# Patient Record
Sex: FEMALE | Race: WHITE
[De-identification: names, ages, dates, MRNs, and addresses within clinical notes are randomized per-mention and may not be internally consistent; named-entity substitution may affect disease eponyms.]

---

## 2017-02-09 ENCOUNTER — HOSPITAL ENCOUNTER (INPATIENT)
Dept: HOSPITAL 17 - NEPE | Age: 67
LOS: 2 days | Discharge: HOME | DRG: 66 | End: 2017-02-11
Attending: HOSPITALIST | Admitting: HOSPITALIST
Payer: MEDICARE

## 2017-02-09 VITALS
RESPIRATION RATE: 20 BRPM | OXYGEN SATURATION: 96 % | SYSTOLIC BLOOD PRESSURE: 157 MMHG | HEART RATE: 75 BPM | DIASTOLIC BLOOD PRESSURE: 82 MMHG

## 2017-02-09 VITALS
SYSTOLIC BLOOD PRESSURE: 149 MMHG | OXYGEN SATURATION: 97 % | RESPIRATION RATE: 20 BRPM | DIASTOLIC BLOOD PRESSURE: 76 MMHG | HEART RATE: 77 BPM

## 2017-02-09 VITALS — BODY MASS INDEX: 24.14 KG/M2 | WEIGHT: 127.87 LBS | HEIGHT: 61 IN

## 2017-02-09 VITALS — OXYGEN SATURATION: 97 %

## 2017-02-09 VITALS
RESPIRATION RATE: 20 BRPM | DIASTOLIC BLOOD PRESSURE: 87 MMHG | SYSTOLIC BLOOD PRESSURE: 194 MMHG | OXYGEN SATURATION: 97 % | HEART RATE: 89 BPM | TEMPERATURE: 98 F

## 2017-02-09 VITALS
RESPIRATION RATE: 20 BRPM | OXYGEN SATURATION: 97 % | HEART RATE: 83 BPM | DIASTOLIC BLOOD PRESSURE: 87 MMHG | SYSTOLIC BLOOD PRESSURE: 194 MMHG

## 2017-02-09 VITALS
DIASTOLIC BLOOD PRESSURE: 73 MMHG | HEART RATE: 76 BPM | SYSTOLIC BLOOD PRESSURE: 143 MMHG | RESPIRATION RATE: 20 BRPM | OXYGEN SATURATION: 99 %

## 2017-02-09 VITALS
SYSTOLIC BLOOD PRESSURE: 162 MMHG | DIASTOLIC BLOOD PRESSURE: 91 MMHG | OXYGEN SATURATION: 99 % | HEART RATE: 86 BPM | TEMPERATURE: 98 F | RESPIRATION RATE: 16 BRPM

## 2017-02-09 VITALS
SYSTOLIC BLOOD PRESSURE: 157 MMHG | OXYGEN SATURATION: 98 % | DIASTOLIC BLOOD PRESSURE: 75 MMHG | RESPIRATION RATE: 18 BRPM | HEART RATE: 80 BPM

## 2017-02-09 VITALS — HEART RATE: 72 BPM

## 2017-02-09 DIAGNOSIS — I63.8: Primary | ICD-10-CM

## 2017-02-09 DIAGNOSIS — Z91.041: ICD-10-CM

## 2017-02-09 DIAGNOSIS — M19.90: ICD-10-CM

## 2017-02-09 DIAGNOSIS — K21.9: ICD-10-CM

## 2017-02-09 DIAGNOSIS — Z91.013: ICD-10-CM

## 2017-02-09 DIAGNOSIS — Z88.1: ICD-10-CM

## 2017-02-09 DIAGNOSIS — E78.5: ICD-10-CM

## 2017-02-09 DIAGNOSIS — Z88.2: ICD-10-CM

## 2017-02-09 LAB
ALP SERPL-CCNC: 75 U/L (ref 45–117)
ALT SERPL-CCNC: 36 U/L (ref 10–53)
ANION GAP SERPL CALC-SCNC: 8 MEQ/L (ref 5–15)
AST SERPL-CCNC: 27 U/L (ref 15–37)
BACTERIA #/AREA URNS HPF: (no result) /HPF
BASOPHILS # BLD AUTO: 0.1 TH/MM3 (ref 0–0.2)
BASOPHILS NFR BLD: 1.1 % (ref 0–2)
BILIRUB SERPL-MCNC: 0.3 MG/DL (ref 0.2–1)
BUN SERPL-MCNC: 18 MG/DL (ref 7–18)
CHLORIDE SERPL-SCNC: 106 MEQ/L (ref 98–107)
CK MB SERPL-MCNC: 15.4 NG/ML (ref 0.5–3.6)
CK SERPL-CCNC: 766 U/L (ref 26–192)
COLOR UR: YELLOW
COMMENT (UR): (no result)
CULTURE IF INDICATED: (no result)
EOSINOPHIL # BLD: 0.4 TH/MM3 (ref 0–0.4)
EOSINOPHIL NFR BLD: 6 % (ref 0–4)
ERYTHROCYTE [DISTWIDTH] IN BLOOD BY AUTOMATED COUNT: 12.3 % (ref 11.6–17.2)
GFR SERPLBLD BASED ON 1.73 SQ M-ARVRAT: 63 ML/MIN (ref 89–?)
GLUCOSE UR STRIP-MCNC: (no result) MG/DL
HCO3 BLD-SCNC: 27.5 MEQ/L (ref 21–32)
HCT VFR BLD CALC: 42.3 % (ref 35–46)
HEMO FLAGS: (no result)
HEMOGLOBIN A1A: 0.8 %
HEMOGLOBIN A1B: 1.7 %
HEMOGLOBIN AO: 85.9 %
HEMOGLOBIN LA1C: 1.9 %
HEMOGLOBIN P3: 3.6 %
HGB UR QL STRIP: (no result)
KETONES UR STRIP-MCNC: (no result) MG/DL
LYMPHOCYTES # BLD AUTO: 2.1 TH/MM3 (ref 1–4.8)
LYMPHOCYTES NFR BLD AUTO: 27.8 % (ref 9–44)
MCH RBC QN AUTO: 30.4 PG (ref 27–34)
MCHC RBC AUTO-ENTMCNC: 33.8 % (ref 32–36)
MCV RBC AUTO: 90.1 FL (ref 80–100)
MONOCYTES NFR BLD: 8.4 % (ref 0–8)
NEUTROPHILS # BLD AUTO: 4.2 TH/MM3 (ref 1.8–7.7)
NEUTROPHILS NFR BLD AUTO: 56.7 % (ref 16–70)
NITRITE UR QL STRIP: (no result)
PLATELET # BLD: 246 TH/MM3 (ref 150–450)
POTASSIUM SERPL-SCNC: 3.8 MEQ/L (ref 3.5–5.1)
RBC # BLD AUTO: 4.7 MIL/MM3 (ref 4–5.3)
SODIUM SERPL-SCNC: 141 MEQ/L (ref 136–145)
SP GR UR STRIP: 1.01 (ref 1–1.03)
SQUAMOUS #/AREA URNS HPF: 4 /HPF (ref 0–5)
WBC # BLD AUTO: 7.4 TH/MM3 (ref 4–11)

## 2017-02-09 PROCEDURE — 93226 XTRNL ECG REC<48 HR SCAN A/R: CPT

## 2017-02-09 PROCEDURE — 87086 URINE CULTURE/COLONY COUNT: CPT

## 2017-02-09 PROCEDURE — 70450 CT HEAD/BRAIN W/O DYE: CPT

## 2017-02-09 PROCEDURE — 85025 COMPLETE CBC W/AUTO DIFF WBC: CPT

## 2017-02-09 PROCEDURE — 82552 ASSAY OF CPK IN BLOOD: CPT

## 2017-02-09 PROCEDURE — 83735 ASSAY OF MAGNESIUM: CPT

## 2017-02-09 PROCEDURE — 80053 COMPREHEN METABOLIC PANEL: CPT

## 2017-02-09 PROCEDURE — 82550 ASSAY OF CK (CPK): CPT

## 2017-02-09 PROCEDURE — 80048 BASIC METABOLIC PNL TOTAL CA: CPT

## 2017-02-09 PROCEDURE — 93005 ELECTROCARDIOGRAM TRACING: CPT

## 2017-02-09 PROCEDURE — 83036 HEMOGLOBIN GLYCOSYLATED A1C: CPT

## 2017-02-09 PROCEDURE — 84484 ASSAY OF TROPONIN QUANT: CPT

## 2017-02-09 PROCEDURE — 93880 EXTRACRANIAL BILAT STUDY: CPT

## 2017-02-09 PROCEDURE — 93225 XTRNL ECG REC<48 HRS REC: CPT

## 2017-02-09 PROCEDURE — 70551 MRI BRAIN STEM W/O DYE: CPT

## 2017-02-09 PROCEDURE — 81001 URINALYSIS AUTO W/SCOPE: CPT

## 2017-02-09 PROCEDURE — 82948 REAGENT STRIP/BLOOD GLUCOSE: CPT

## 2017-02-09 PROCEDURE — 93306 TTE W/DOPPLER COMPLETE: CPT

## 2017-02-09 PROCEDURE — 80061 LIPID PANEL: CPT

## 2017-02-09 RX ADMIN — ATORVASTATIN CALCIUM SCH MG: 20 TABLET, FILM COATED ORAL at 21:35

## 2017-02-09 RX ADMIN — SODIUM CHLORIDE, PRESERVATIVE FREE SCH ML: 5 INJECTION INTRAVENOUS at 21:00

## 2017-02-09 RX ADMIN — INSULIN ASPART SCH: 100 INJECTION, SOLUTION INTRAVENOUS; SUBCUTANEOUS at 21:00
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
## 2017-02-09 NOTE — RADRPT
EXAM DATE/TIME:  02/09/2017 07:50 

 

[facility] COMPARISON:     

No previous studies available for comparison.

 

 

INDICATIONS :     

Woke up with left arm numbness and tingling

                      

 

RADIATION DOSE:     

39.44 CTDIvol (mGy) 

 

 

 

MEDICAL HISTORY :     

None  

 

SURGICAL HISTORY :      

None. 

 

ENCOUNTER:      

Initial

 

ACUITY:      

1 day

 

PAIN SCALE:      

0/10

 

LOCATION:        

cranial 

 

TECHNIQUE:     

Multiple contiguous axial images were obtained of the head.  Using automated exposure control and adj
ustment of the mA and/or kV according to patient size, radiation dose was kept as low as reasonably a
chievable to obtain optimal diagnostic quality images. 

 

        FINDINGS:

 

CEREBRUM:     

The ventricles are normal for age.  No evidence of midline shift, mass lesion, hemorrhage or acute in
farction.  No extra-axial fluid collections are seen. Tiny old lacunar infarcts are noted within the 
right basal ganglia.

 

POSTERIOR FOSSA:     

The cerebellum and brainstem are intact.  The 4th ventricle is midline.  The cerebellopontine angle i
s unremarkable.

 

EXTRACRANIAL:     

The visualized portion of the orbits is intact. Mild mucosal thickening is noted involving the bilate
ral ethmoid and sphenoid sinuses.

 

SKULL:     

The calvaria is intact.  No evidence of skull fracture.

 

CONCLUSION:     

1. No acute hemorrhage, acute infarct, mass effect or extra axial fluid collections. 

2. Tiny old lacunar infarcts within the right basal ganglia. 

3. Mild mucosal thickening involving the bilateral ethmoid and sphenoid sinuses. 

 

 

 Asa Longoria MD on February 09, 2017 at 8:12           

Board Certified Radiologist.

 This report was verified electronically.
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
## 2017-02-09 NOTE — EKG
Date Performed: 02/09/2017       Time Performed: 08:32:56

 

PTAGE:      66 years

 

EKG:      Sinus rhythm 

 

 NORMAL ECG 

 

NO PREVIOUS TRACING            

 

DOCTOR:   Ashu Fry  Interpretating Date/Time  02/09/2017 13:39:51

## 2017-02-09 NOTE — EC
Study

 

Study Date:02/09/2017

 

 

 

STUDY CONCLUSIONS

 

SUMMARY

 

- Left ventricle: The cavity size was normal. Wall thickness was

increased in a pattern of mild LVH. Systolic function was normal.

The estimated ejection fraction was in the range of 55% to 60%.

Wall motion was normal; there were no regional wall motion

abnormalities. Doppler parameters are consistent with abnormal

left ventricular relaxation (grade 1 diastolic dysfunction).

- Aortic valve: Mild regurgitation.

- Left atrium: The atrium was mildly dilated.

 

-------------------------------------------------------------------

If LV function is below 40, please consider prescribing an ACEI or

ARB or document rationale for non-use.

 

-------------------------------------------------------------------

PROCEDURE DATA

 

STUDY STATUS:

Elective. Procedure: Transthoracic echocardiography.

Image quality was good. Scanning was performed from the

parasternal, apical, and subcostal acoustic windows. Study

completion: The patient tolerated the procedure well.

Transthoracic echocardiography. M-mode, complete 2D, complete

spectral Doppler, and color Doppler. Patient status: Inpatient.

 

-------------------------------------------------------------------

CARDIAC ANATOMY

 

LEFT VENTRICLE:

The cavity size was normal. Wall thickness was

increased in a pattern of mild LVH. Systolic function was normal.

The estimated ejection fraction was in the range of 55% to 60%.

Wall motion was normal; there were no regional wall motion

abnormalities. Doppler parameters are consistent with abnormal left

ventricular relaxation (grade 1 diastolic dysfunction).

 

AORTIC VALVE:

Trileaflet; normal thickness leaflets. Doppler:

Transvalvular velocity was within the normal range. There was no

stenosis. Mild regurgitation.

 

AORTA:

Aortic root: The aortic root was normal in size.

 

MITRAL VALVE:

Structurally normal valve. Doppler: Transvalvular

velocity was within the normal range. There was no evidence for

stenosis. Trace regurgitation.

 

LEFT ATRIUM:

The atrium was mildly dilated.

 

RIGHT VENTRICLE:

The cavity size was normal. Wall thickness was

normal.

 

PULMONIC VALVE:

Doppler: Transvalvular velocity was within the

normal range. There was no evidence for stenosis. No regurgitation.

 

TRICUSPID VALVE:

Structurally normal valve. Doppler: Transvalvular

velocity was within the normal range. No regurgitation.

 

PULMONARY ARTERY:

The main pulmonary artery was normal-sized.

Systolic pressure was within the normal range.

 

RIGHT ATRIUM:

The atrium was normal in size.

 

PERICARDIUM:

There was no pericardial effusion.

 

SYSTEMIC VEINS:

Inferior vena cava: The vessel was normal in size.

 

-------------------------------------------------------------------

 

BASIC MEASUREMENTS                                      ADULT

Normal

Left ventricle

LV internal dimension, ED, chordal level,     *42 mm    43-52

PLAX

LV internal dimension, ES, chordal level,    31.3 mm    23-38

PLAX

Fractional shortening, chordal level, PLAX    *25 %     >29

LV posterior wall thickness, ED              6.33 mm    -----------

IVS/LVPW ratio, ED                          *1.35       <1.3

Ventricular septum

Septal thickness, ED                         8.53 mm    -----------

Aortic valve

Leaflet separation                             19 mm    15-26

Left atrium

Anterior-posterior dimension                   32 mm    -----------

Right ventricle

RV internal dimension, ED, PLAX             *17.6 mm    19-38

 

BASIC MEASUREMENTS                                      ADULT

Normal

Aortic valve

Leaflet separation                             19 mm    15-26

Aorta

Root diameter, ED                              32 mm    20-37

 

DOPPLER MEASUREMENTS                                    ADULT

Normal

Main pulmonary artery

Pressure, S                                    24 mm Hg =30

Mitral valve

Peak E-wave velocity                         60.2 cm/s  -----------

Peak A-wave velocity                         84.9 cm/s  -----------

Peak E/A ratio                                0.7       -----------

Tricuspid valve

Regurgitant peak velocity                     171 cm/s  -----------

Peak RV-RA gradient, S                         12 mm Hg -----------

Maximal regurgitant velocity                  171 cm/s  -----------

Systemic veins

Estimated CVP                                   5 mm Hg -----------

Right ventricle

RV pressure, S                                 24 mm Hg <30

 

LEGEND:

Mean values are shown as u=mean value.

Asterisk (*) marks values outside specified normal range.

Prepared and signed by

 

Mario Zhu

[id number]
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
## 2017-02-10 VITALS
TEMPERATURE: 97.9 F | SYSTOLIC BLOOD PRESSURE: 129 MMHG | DIASTOLIC BLOOD PRESSURE: 68 MMHG | OXYGEN SATURATION: 95 % | RESPIRATION RATE: 19 BRPM | HEART RATE: 56 BPM

## 2017-02-10 VITALS
SYSTOLIC BLOOD PRESSURE: 115 MMHG | OXYGEN SATURATION: 96 % | DIASTOLIC BLOOD PRESSURE: 77 MMHG | RESPIRATION RATE: 19 BRPM | HEART RATE: 59 BPM

## 2017-02-10 VITALS
RESPIRATION RATE: 18 BRPM | SYSTOLIC BLOOD PRESSURE: 121 MMHG | DIASTOLIC BLOOD PRESSURE: 64 MMHG | TEMPERATURE: 97.9 F | OXYGEN SATURATION: 96 % | HEART RATE: 59 BPM

## 2017-02-10 VITALS
HEART RATE: 76 BPM | RESPIRATION RATE: 20 BRPM | DIASTOLIC BLOOD PRESSURE: 67 MMHG | SYSTOLIC BLOOD PRESSURE: 120 MMHG | OXYGEN SATURATION: 97 % | TEMPERATURE: 97.7 F

## 2017-02-10 VITALS
TEMPERATURE: 98.2 F | SYSTOLIC BLOOD PRESSURE: 121 MMHG | OXYGEN SATURATION: 94 % | DIASTOLIC BLOOD PRESSURE: 68 MMHG | HEART RATE: 64 BPM | RESPIRATION RATE: 18 BRPM

## 2017-02-10 VITALS
RESPIRATION RATE: 20 BRPM | OXYGEN SATURATION: 93 % | SYSTOLIC BLOOD PRESSURE: 110 MMHG | TEMPERATURE: 97.9 F | DIASTOLIC BLOOD PRESSURE: 67 MMHG | HEART RATE: 58 BPM

## 2017-02-10 VITALS — HEART RATE: 80 BPM

## 2017-02-10 VITALS — HEART RATE: 59 BPM

## 2017-02-10 VITALS
OXYGEN SATURATION: 96 % | TEMPERATURE: 97.4 F | RESPIRATION RATE: 20 BRPM | SYSTOLIC BLOOD PRESSURE: 110 MMHG | HEART RATE: 64 BPM | DIASTOLIC BLOOD PRESSURE: 67 MMHG

## 2017-02-10 VITALS — OXYGEN SATURATION: 96 %

## 2017-02-10 LAB
ANION GAP SERPL CALC-SCNC: 8 MEQ/L (ref 5–15)
BASOPHILS # BLD AUTO: 0.1 TH/MM3 (ref 0–0.2)
BASOPHILS NFR BLD: 0.8 % (ref 0–2)
BUN SERPL-MCNC: 18 MG/DL (ref 7–18)
CHLORIDE SERPL-SCNC: 106 MEQ/L (ref 98–107)
EOSINOPHIL # BLD: 0.2 TH/MM3 (ref 0–0.4)
EOSINOPHIL NFR BLD: 1.5 % (ref 0–4)
ERYTHROCYTE [DISTWIDTH] IN BLOOD BY AUTOMATED COUNT: 12.3 % (ref 11.6–17.2)
GFR SERPLBLD BASED ON 1.73 SQ M-ARVRAT: 86 ML/MIN (ref 89–?)
HCO3 BLD-SCNC: 25.8 MEQ/L (ref 21–32)
HCT VFR BLD CALC: 39.2 % (ref 35–46)
HDLC SERPL-MCNC: 51.5 MG/DL (ref 40–60)
HEMO FLAGS: (no result)
LDLC SERPL-MCNC: 132 MG/DL (ref 0–99)
LYMPHOCYTES # BLD AUTO: 2 TH/MM3 (ref 1–4.8)
LYMPHOCYTES NFR BLD AUTO: 18.5 % (ref 9–44)
MAGNESIUM SERPL-MCNC: 2 MG/DL (ref 1.5–2.5)
MCH RBC QN AUTO: 30.8 PG (ref 27–34)
MCHC RBC AUTO-ENTMCNC: 34.2 % (ref 32–36)
MCV RBC AUTO: 90.1 FL (ref 80–100)
MONOCYTES NFR BLD: 7.7 % (ref 0–8)
NEUTROPHILS # BLD AUTO: 7.8 TH/MM3 (ref 1.8–7.7)
NEUTROPHILS NFR BLD AUTO: 71.5 % (ref 16–70)
PLATELET # BLD: 236 TH/MM3 (ref 150–450)
POTASSIUM SERPL-SCNC: 3.7 MEQ/L (ref 3.5–5.1)
RBC # BLD AUTO: 4.35 MIL/MM3 (ref 4–5.3)
SODIUM SERPL-SCNC: 140 MEQ/L (ref 136–145)
WBC # BLD AUTO: 10.9 TH/MM3 (ref 4–11)

## 2017-02-10 RX ADMIN — SODIUM CHLORIDE, PRESERVATIVE FREE SCH ML: 5 INJECTION INTRAVENOUS at 22:08

## 2017-02-10 RX ADMIN — SODIUM CHLORIDE, PRESERVATIVE FREE SCH ML: 5 INJECTION INTRAVENOUS at 09:00

## 2017-02-10 RX ADMIN — INSULIN ASPART SCH: 100 INJECTION, SOLUTION INTRAVENOUS; SUBCUTANEOUS at 21:00

## 2017-02-10 RX ADMIN — ATORVASTATIN CALCIUM SCH MG: 20 TABLET, FILM COATED ORAL at 22:08

## 2017-02-10 RX ADMIN — PANTOPRAZOLE SODIUM SCH MG: 20 TABLET, DELAYED RELEASE ORAL at 09:00

## 2017-02-10 RX ADMIN — ASPIRIN SCH MG: 325 TABLET, DELAYED RELEASE ORAL at 09:00

## 2017-02-10 RX ADMIN — INSULIN ASPART SCH: 100 INJECTION, SOLUTION INTRAVENOUS; SUBCUTANEOUS at 07:00

## 2017-02-10 RX ADMIN — INSULIN ASPART SCH: 100 INJECTION, SOLUTION INTRAVENOUS; SUBCUTANEOUS at 18:30

## 2017-02-10 RX ADMIN — INSULIN ASPART SCH: 100 INJECTION, SOLUTION INTRAVENOUS; SUBCUTANEOUS at 12:30

## 2017-02-10 NOTE — HHI.PR
Subjective


Remarks


just minor tingling left lip.


ambulating.





Objective


Vitals


heart reg


lung cta


abd s/nt


ext no edema


 Vital Signs








  Date Time  Temp Pulse Resp B/P Pulse Ox O2 Delivery O2 Flow Rate FiO2


 


2/10/17 12:00 97.9 58 20 110/67 93   


 


2/10/17 08:53     96   21


 


2/10/17 08:35  59 19 115/62 96   





    114/62    





    131/77    


 


2/10/17 07:33 97.9 59 18 121/64 96   


 


2/10/17 03:42 97.4 64 20 110/67 96   


 


2/10/17 00:10 97.9 56 19 129/68 95   


 


2/9/17 23:30  72      


 


2/9/17 21:00     97   21


 


2/9/17 19:03  80 18 157/75 98 Room Air  


 


2/9/17 14:22  75 20 157/82 96 Room Air  








Result Diagram:  


2/10/17 0639                                                                   

             2/10/17 0639





Imaging





Last Impressions








Brain MRI 2/9/17 0920 Signed





Impressions: 





 Service Date/Time:  Thursday, February 9, 2017 12:21 - CONCLUSION:  1. Tiny 





 faint focal signal abnormality within the right thalamus on the diffusion 





 weighted images suggesting acute/subacute lacunar infarct.  2. No acute 





 hemorrhage, midline shift or extra-axial fluid collection.  3. Mucosal 





 thickening involving the bilateral sphenoid sinuses and posterior ethmoid air 





 cells.     Asa Longoria MD 


 


Head CT 2/9/17 0724 Signed





Impressions: 





 Service Date/Time:  Thursday, February 9, 2017 07:50 - CONCLUSION:  1. No 

acute 





 hemorrhage, acute infarct, mass effect or extra axial fluid collections.  2. 





 Tiny old lacunar infarcts within the right basal ganglia.  3. Mild mucosal 





 thickening involving the bilateral ethmoid and sphenoid sinuses.     Asa Longoria MD 


 


Abdomen X-Ray 2/9/17 0000 Signed





Impressions: 





 Service Date/Time:  Thursday, February 9, 2017 10:30 - CONCLUSION:  No 

evidence 





 of obstruction.  No MRI incompatible foreign body is identified. 

Cholelithiasis 





 incidentally noted.     Austyn Lacey MD 











A/P


Problem List:  


(1) CVA (cerebral vascular accident)


Status:  Acute


Plan:  


- Pt presented to the ED with complaints of left facial tingling and left arm 

and leg tingling that began this morning upon awakening. 


- In the ED her BP was noted to be elevated, 162/91 and elevated to 194/87. 


- Head CT which showed no acute hemorrhage, acute infarct, mass effect or extra 

axial fluid collections, tiny old lacunar infarcts 


 within the right basal ganglia and mild mucosal thickening involving the 

bilateral ethmoid and sphenoid sinuses. 


- MRI Brain which indicated a tiny faint focal signal abnormality within the 

right thalamus on the diffusion weighted images suggesting 


 acute/subacute lacunar infarct but no acute hemorrhage, midline shift or extra-

axial fluid collection.


- Pt is being admitted for CVA. 


-


d/c hob flat. d/c ivf.


cont asa/statin


monitor tele/holter.


ambulate


if stable then d/c in AM.











Zeke Flores MD Feb 10, 2017 12:54

## 2017-02-11 VITALS
OXYGEN SATURATION: 95 % | DIASTOLIC BLOOD PRESSURE: 70 MMHG | HEART RATE: 61 BPM | SYSTOLIC BLOOD PRESSURE: 116 MMHG | RESPIRATION RATE: 18 BRPM

## 2017-02-11 VITALS
TEMPERATURE: 98.2 F | OXYGEN SATURATION: 97 % | HEART RATE: 68 BPM | RESPIRATION RATE: 19 BRPM | DIASTOLIC BLOOD PRESSURE: 56 MMHG | SYSTOLIC BLOOD PRESSURE: 118 MMHG

## 2017-02-11 VITALS
OXYGEN SATURATION: 98 % | SYSTOLIC BLOOD PRESSURE: 116 MMHG | RESPIRATION RATE: 18 BRPM | HEART RATE: 70 BPM | DIASTOLIC BLOOD PRESSURE: 70 MMHG | TEMPERATURE: 98 F

## 2017-02-11 VITALS — HEART RATE: 78 BPM

## 2017-02-11 RX ADMIN — INSULIN ASPART SCH: 100 INJECTION, SOLUTION INTRAVENOUS; SUBCUTANEOUS at 05:45

## 2017-02-11 RX ADMIN — ASPIRIN SCH MG: 325 TABLET, DELAYED RELEASE ORAL at 08:20

## 2017-02-11 RX ADMIN — PANTOPRAZOLE SODIUM SCH MG: 20 TABLET, DELAYED RELEASE ORAL at 08:19

## 2017-02-11 RX ADMIN — INSULIN ASPART SCH: 100 INJECTION, SOLUTION INTRAVENOUS; SUBCUTANEOUS at 11:00

## 2017-02-11 RX ADMIN — SODIUM CHLORIDE, PRESERVATIVE FREE SCH ML: 5 INJECTION INTRAVENOUS at 08:20

## 2017-02-11 NOTE — HHI.DCPOC
Discharge Care Plan


Diagnosis:  


(1) CVA (cerebral vascular accident)


Goals to Promote Your Health


* To prevent worsening of your condition and complications


* To maintain your health at the optimal level


Directions to Meet Your Goals


*** Take your medications as prescribed


*** Follow your dietary instruction


*** Follow activity as directed








*** Keep your appointments as scheduled


*** Take your immunizations and boosters as scheduled


*** If your symptoms worsen call your PCP, if no PCP go to Urgent Care Center 

or Emergency Room***


*** Smoking is Dangerous to Your Health. Avoid second hand smoke***


***Call the 24-hour hour crisis hotline for domestic abuse at 1-831.800.8934***








Zeke Flores MD Feb 11, 2017 12:06

## 2017-02-11 NOTE — HHI.PR
Subjective


Remarks


feels good . wants to go home.





Objective


Vitals


heart rg


lung cta


abd s/nte


ext no edema


 Vital Signs








  Date Time  Temp Pulse Resp B/P Pulse Ox O2 Delivery O2 Flow Rate FiO2


 


2/11/17 08:30  61 18 116/70 95   


 


2/11/17 03:54 98.2 68 19 118/56 97   


 


2/11/17 00:24 98.0 70 18 116/70 98   


 


2/10/17 20:11 98.2 64 18 121/68 94   


 


2/10/17 20:00  80      


 


2/10/17 15:40 97.7 76 20 120/67 97   








Result Diagram:  


2/10/17 0639                                                                   

             2/10/17 0639





Imaging





Last Impressions








Brain MRI 2/9/17 0920 Signed





Impressions: 





 Service Date/Time:  Thursday, February 9, 2017 12:21 - CONCLUSION:  1. Tiny 





 faint focal signal abnormality within the right thalamus on the diffusion 





 weighted images suggesting acute/subacute lacunar infarct.  2. No acute 





 hemorrhage, midline shift or extra-axial fluid collection.  3. Mucosal 





 thickening involving the bilateral sphenoid sinuses and posterior ethmoid air 





 cells.     Asa Longoria MD 


 


Head CT 2/9/17 0724 Signed





Impressions: 





 Service Date/Time:  Thursday, February 9, 2017 07:50 - CONCLUSION:  1. No 

acute 





 hemorrhage, acute infarct, mass effect or extra axial fluid collections.  2. 





 Tiny old lacunar infarcts within the right basal ganglia.  3. Mild mucosal 





 thickening involving the bilateral ethmoid and sphenoid sinuses.     Asa Longoria MD 


 


Abdomen X-Ray 2/9/17 0000 Signed





Impressions: 





 Service Date/Time:  Thursday, February 9, 2017 10:30 - CONCLUSION:  No 

evidence 





 of obstruction.  No MRI incompatible foreign body is identified. 

Cholelithiasis 





 incidentally noted.     Austyn Lacey MD 











A/P


Problem List:  


(1) CVA (cerebral vascular accident)


Status:  Acute


Plan:  


- Pt presented to the ED with complaints of left facial tingling and left arm 

and leg tingling that began this morning upon awakening. 


- In the ED her BP was noted to be elevated, 162/91 and elevated to 194/87. 


- Head CT which showed no acute hemorrhage, acute infarct, mass effect or extra 

axial fluid collections, tiny old lacunar infarcts 


 within the right basal ganglia and mild mucosal thickening involving the 

bilateral ethmoid and sphenoid sinuses. 


- MRI Brain which indicated a tiny faint focal signal abnormality within the 

right thalamus on the diffusion weighted images suggesting 


 acute/subacute lacunar infarct but no acute hemorrhage, midline shift or extra-

axial fluid collection.


- Pt is being admitted for CVA. 


-


tele nsr


f/u holter


cont asa/statin. this could be increased as outpt as tolerated.


d/c with neuro f/u.











Zeke Flores MD Feb 11, 2017 12:04

## 2017-02-12 NOTE — HM
Date Performed: 02/11/2017       Time Performed: 10:31:00

 

HOOKUP DATE:      02/11/17 10:31:00 AM Sat 

 

     

ANALYSIS START TIME:      2/11/2017 10:36:00 AM

     

ANALYSIS END TIME:      2/12/2017 10:40:00 AM

     

PATIENT AGE:      66

     

PATIENT HEIGHT:      61

     

PATIENT WEIGHT:      127

     

DRUG LIST     

     

PATIENT DIAGNOSIS:      STROKE

     

TEST NARRATIVE:          The patient's average heart rate was 71 BPM.  No episodes of tachycardia wer
e noted.  Heart rates less than 50 BPM were noted < 1% of the time.     No pauses exceeding 2.0 secon
ds were noted.     205 ventricular ectopics, which represented < 1% of the total beat count, were not
ed.  The highest ventricular ectopic frequency occurred from 07:00 AM to 08:00 AM Sun.  During this t
nevaeh 42 VE(s) occurred.  Ventricular ectopics were observed as 205 isolated beat(s) only.  No couplets
 or runs were noted.     6 supraventricular ectopics, which represented < 1% of the total beat count,
 were noted.  The highest supraventricular ectopic frequency occurred from 02:00 PM to 03:00 PM Sat. 
 During this time 1 SVE(s) occurred.     No episodes of ST depression (defined as -1.0 mm or more) we
re noted in channel 1.  No episodes of ST depression (defined as -1.0 mm or more) were noted in chann
el 2.  No episodes of ST depression (defined as -1.0 mm or more) were noted in channel 3.      No ludwin
ry events were reported by the patient.

     

TEST INTERPRETATION:      Holter monitor is for 24 hours and 4 minutes with a minimum HR of 49 and ma
ximum HR of 107 with an average of 71. There 2 identified PVCs and 6 PACs, but no atrial fibrillation
 Conclusions: Occasional ectopy of above, but no sustained atrial fibrillation or other dysrhythmia. 
So symptoms were reported with this recording                                                        
             Signed by : Marshall Tate

## 2017-02-13 ENCOUNTER — HOSPITAL ENCOUNTER (OUTPATIENT)
Dept: HOSPITAL 17 - NEPC | Age: 67
Setting detail: OBSERVATION
LOS: 1 days | Discharge: HOME | End: 2017-02-14
Attending: INTERNAL MEDICINE | Admitting: INTERNAL MEDICINE
Payer: MEDICARE

## 2017-02-13 VITALS — BODY MASS INDEX: 24.03 KG/M2 | HEIGHT: 61 IN | WEIGHT: 127.27 LBS

## 2017-02-13 VITALS
OXYGEN SATURATION: 96 % | DIASTOLIC BLOOD PRESSURE: 70 MMHG | RESPIRATION RATE: 16 BRPM | HEART RATE: 75 BPM | SYSTOLIC BLOOD PRESSURE: 153 MMHG

## 2017-02-13 VITALS
SYSTOLIC BLOOD PRESSURE: 129 MMHG | OXYGEN SATURATION: 95 % | HEART RATE: 77 BPM | DIASTOLIC BLOOD PRESSURE: 74 MMHG | RESPIRATION RATE: 18 BRPM

## 2017-02-13 VITALS
RESPIRATION RATE: 16 BRPM | OXYGEN SATURATION: 97 % | DIASTOLIC BLOOD PRESSURE: 81 MMHG | HEART RATE: 68 BPM | TEMPERATURE: 97.8 F | SYSTOLIC BLOOD PRESSURE: 177 MMHG

## 2017-02-13 VITALS
HEART RATE: 76 BPM | SYSTOLIC BLOOD PRESSURE: 199 MMHG | OXYGEN SATURATION: 98 % | DIASTOLIC BLOOD PRESSURE: 87 MMHG | RESPIRATION RATE: 16 BRPM

## 2017-02-13 VITALS — OXYGEN SATURATION: 95 %

## 2017-02-13 DIAGNOSIS — K21.9: ICD-10-CM

## 2017-02-13 DIAGNOSIS — Z82.49: ICD-10-CM

## 2017-02-13 DIAGNOSIS — Z86.73: ICD-10-CM

## 2017-02-13 DIAGNOSIS — E78.5: ICD-10-CM

## 2017-02-13 DIAGNOSIS — R06.02: Primary | ICD-10-CM

## 2017-02-13 LAB
ALP SERPL-CCNC: 74 U/L (ref 45–117)
ALT SERPL-CCNC: 37 U/L (ref 10–53)
ANION GAP SERPL CALC-SCNC: 6 MEQ/L (ref 5–15)
APTT BLD: 26.4 SEC (ref 24.3–30.1)
AST SERPL-CCNC: 26 U/L (ref 15–37)
BASOPHILS # BLD AUTO: 0.1 TH/MM3 (ref 0–0.2)
BASOPHILS NFR BLD: 0.7 % (ref 0–2)
BILIRUB SERPL-MCNC: 0.3 MG/DL (ref 0.2–1)
BUN SERPL-MCNC: 20 MG/DL (ref 7–18)
CHLORIDE SERPL-SCNC: 104 MEQ/L (ref 98–107)
CK MB SERPL-MCNC: 5.4 NG/ML (ref 0.5–3.6)
CK SERPL-CCNC: 249 U/L (ref 26–192)
EOSINOPHIL # BLD: 0.3 TH/MM3 (ref 0–0.4)
EOSINOPHIL NFR BLD: 2.9 % (ref 0–4)
ERYTHROCYTE [DISTWIDTH] IN BLOOD BY AUTOMATED COUNT: 12.1 % (ref 11.6–17.2)
GFR SERPLBLD BASED ON 1.73 SQ M-ARVRAT: 62 ML/MIN (ref 89–?)
HCO3 BLD-SCNC: 30.8 MEQ/L (ref 21–32)
HCT VFR BLD CALC: 40.9 % (ref 35–46)
HEMO FLAGS: (no result)
INR PPP: 1 RATIO
LYMPHOCYTES # BLD AUTO: 2.4 TH/MM3 (ref 1–4.8)
LYMPHOCYTES NFR BLD AUTO: 24.5 % (ref 9–44)
MAGNESIUM SERPL-MCNC: 2.1 MG/DL (ref 1.5–2.5)
MCH RBC QN AUTO: 30.2 PG (ref 27–34)
MCHC RBC AUTO-ENTMCNC: 33.6 % (ref 32–36)
MCV RBC AUTO: 89.9 FL (ref 80–100)
MONOCYTES NFR BLD: 8.4 % (ref 0–8)
NEUTROPHILS # BLD AUTO: 6.2 TH/MM3 (ref 1.8–7.7)
NEUTROPHILS NFR BLD AUTO: 63.5 % (ref 16–70)
PLATELET # BLD: 242 TH/MM3 (ref 150–450)
POTASSIUM SERPL-SCNC: 3.8 MEQ/L (ref 3.5–5.1)
PROTHROMBIN TIME: 10.5 SEC (ref 9.8–11.6)
RBC # BLD AUTO: 4.55 MIL/MM3 (ref 4–5.3)
SODIUM SERPL-SCNC: 141 MEQ/L (ref 136–145)
WBC # BLD AUTO: 9.7 TH/MM3 (ref 4–11)

## 2017-02-13 PROCEDURE — 78582 LUNG VENTILAT&PERFUS IMAGING: CPT

## 2017-02-13 PROCEDURE — 82552 ASSAY OF CPK IN BLOOD: CPT

## 2017-02-13 PROCEDURE — 85610 PROTHROMBIN TIME: CPT

## 2017-02-13 PROCEDURE — 93017 CV STRESS TEST TRACING ONLY: CPT

## 2017-02-13 PROCEDURE — 84484 ASSAY OF TROPONIN QUANT: CPT

## 2017-02-13 PROCEDURE — 85379 FIBRIN DEGRADATION QUANT: CPT

## 2017-02-13 PROCEDURE — A9567 TECHNETIUM TC-99M AEROSOL: HCPCS

## 2017-02-13 PROCEDURE — G0378 HOSPITAL OBSERVATION PER HR: HCPCS

## 2017-02-13 PROCEDURE — 99285 EMERGENCY DEPT VISIT HI MDM: CPT

## 2017-02-13 PROCEDURE — 83735 ASSAY OF MAGNESIUM: CPT

## 2017-02-13 PROCEDURE — 85730 THROMBOPLASTIN TIME PARTIAL: CPT

## 2017-02-13 PROCEDURE — A9540 TC99M MAA: HCPCS

## 2017-02-13 PROCEDURE — 93005 ELECTROCARDIOGRAM TRACING: CPT

## 2017-02-13 PROCEDURE — 80053 COMPREHEN METABOLIC PANEL: CPT

## 2017-02-13 PROCEDURE — 71010: CPT

## 2017-02-13 PROCEDURE — 85025 COMPLETE CBC W/AUTO DIFF WBC: CPT

## 2017-02-13 PROCEDURE — 82550 ASSAY OF CK (CPK): CPT

## 2017-02-13 PROCEDURE — 83880 ASSAY OF NATRIURETIC PEPTIDE: CPT

## 2017-02-13 NOTE — RADRPT
EXAM DATE/TIME:  02/13/2017 19:46 

 

[facility] COMPARISON:     

No previous studies available for comparison.

 

                     

INDICATIONS :     

Short of breath.

                     

 

MEDICAL HISTORY :     

None.          

 

SURGICAL HISTORY :     

None.   

 

ENCOUNTER:     

Initial                                        

 

ACUITY:     

1 day      

 

PAIN SCORE:     

0/10

 

LOCATION:     

Bilateral chest 

 

FINDINGS:     

A single view of the chest demonstrates the lungs to be symmetrically aerated without evidence of mas
s, infiltrate or effusion.  The cardiomediastinal contours are unremarkable.  Osseous structures are 
intact.

 

CONCLUSION:     No acute disease.  

 

 

 

 Stalin Mora MD on February 13, 2017 at 20:20           

Board Certified Radiologist.

 This report was verified electronically.

## 2017-02-13 NOTE — RADRPT
EXAM DATE/TIME:  02/13/2017 22:18 

 

[facility] COMPARISON:     

CHEST SINGLE AP, February 13, 2017, 19:46.

 

 

INDICATIONS :      

Dyspnea for one day with recent stroke.

                       

 

DOSE:      

8.5 mCi Tc99m MAA IV 

                                           1.03 mCi Tc99m DTPA aerosol 

                       

                       

 

MEDICAL HISTORY :     

Gastroesophageal reflux disease. Stroke  Heart murmur.

 

SURGICAL HISTORY :      

Appendectomy.    Exploratory abdominal surgery and nasal surgery.

 

ENCOUNTER:     

Initial

 

ACUITY:     

1 day

 

PAIN SCALE:     

0/10

 

LOCATION:       

chest 

 

TECHNIQUE:     

Following five minutes of tidal breathing of DTPA aerosol, planar images of the lungs were performed 
in eight projections.  The patient was then injected with MAA, and eight-view perfusion scan was perf
ormed.  

 

FINDINGS:     

There is a homogeneous pattern of aerosol delivery to the periphery of both lungs.  No focal ventilat
ory defects are seen.

 

The perfusion lung scan demonstrates a homogenous pattern of uptake in both lungs.  No segmental or s
ubsegmental defects are seen. 

 

CONCLUSION:     Low probability for pulmonary embolus.

 

 

 

 Stalin Mora MD on February 13, 2017 at 22:41           

Board Certified Radiologist.

 This report was verified electronically.

## 2017-02-14 VITALS
SYSTOLIC BLOOD PRESSURE: 127 MMHG | OXYGEN SATURATION: 96 % | DIASTOLIC BLOOD PRESSURE: 79 MMHG | RESPIRATION RATE: 20 BRPM | HEART RATE: 76 BPM | TEMPERATURE: 97.8 F

## 2017-02-14 VITALS
SYSTOLIC BLOOD PRESSURE: 113 MMHG | OXYGEN SATURATION: 94 % | TEMPERATURE: 97.2 F | RESPIRATION RATE: 18 BRPM | HEART RATE: 72 BPM | DIASTOLIC BLOOD PRESSURE: 67 MMHG

## 2017-02-14 VITALS — HEART RATE: 72 BPM

## 2017-02-14 VITALS
TEMPERATURE: 98.4 F | OXYGEN SATURATION: 96 % | SYSTOLIC BLOOD PRESSURE: 118 MMHG | HEART RATE: 75 BPM | DIASTOLIC BLOOD PRESSURE: 63 MMHG | RESPIRATION RATE: 18 BRPM

## 2017-02-14 VITALS — HEART RATE: 66 BPM

## 2017-02-14 LAB
CK MB SERPL-MCNC: 3.4 NG/ML (ref 0.5–3.6)
CK SERPL-CCNC: 192 U/L (ref 26–192)

## 2017-02-14 NOTE — EKG
Date Performed: 2017       Time Performed: 19:46:11

 

PTAGE:      66 years

 

EKG:      Sinus rhythm 

 

 NORMAL ECG Since 

 

 PREVIOUS TRACING            , no significant change noted PREVIOUS TRACIN2017 08.32

 

DOCTOR:   Jessy Carrera  Interpretating Date/Time  2017 10:11:12

## 2017-02-14 NOTE — EKG
Date Performed: 2017       Time Performed: 01:23:19

 

PTAGE:      66 years

 

EKG:      Sinus rhythm 

 

 NORMAL ECG Since 

 

 PREVIOUS TRACING            , no significant change noted PREVIOUS TRACIN2017 22.48

 

DOCTOR:   Jessy Carrear  Interpretating Date/Time  2017 10:09:54

## 2017-02-14 NOTE — TR
Date Performed: 02/14/2017       Time Performed: 08:15:11

 

DOCTOR:      Jessy Carrera 

 

DRUG LIST:     

CLINICAL HISTORY:      SUDDEN ONSET SOB

REASON FOR TEST:     

REASON FOR ENDING:     

OBSERVATION:     

CONCLUSION:      LIZ PROTOCOL. NO CP. TEST STOPPED AFTER EXCEEDING GOAL HR SSECONDARY TO LEG FATIGU
E AND MILD SOB.Maximum ZX=915 % Target HR Achieved=91.0% Maximum JF=522/70 Total Exercise Time=8:55

COMMENTS:

## 2017-02-14 NOTE — EKG
Date Performed: 2017       Time Performed: 22:48:37

 

PTAGE:      66 years

 

EKG:      Sinus rhythm 

 

 NORMAL ECG Since 

 

 PREVIOUS TRACING            , no significant change noted PREVIOUS TRACIN2017 19.46

 

DOCTOR:   Jessy Carrera  Interpretating Date/Time  2017 10:09:42

## 2017-02-14 NOTE — HHI.HP
HPI


Primary Care Physician


Zeke Cuevas MD


Chief Complaint


Shortness of breath and elevated blood pressure


History of Present Illness


This is a 66-year-old female that presents to the ED with recent history of a 

TIA/CVA last week.  She states that she was placed on aspirin and Lipitor.  She 

presents to the ED last night complaining of shortness of breath the last about 

45 minutes.  She checked her blood pressure and thought that his high so she 

called her neighbor who is a retired nurse and had a low pressure rechecked and 

was high again so she presented to the ED.  Denies chest pain.  Denies 

diaphoresis.  Denies nausea.  She states that her symptoms last week and 

presented her to the ED for dilation of her CVA/TIA included tingling in her 

face and left arm and leg.  She did not have weakness in her extremities and 

was able to speak.  She states that she otherwise has been healthy.  She 

excised very regularly.  She has a two-story house and walks in on anastrozole 

at time and never has any issues with shortness breath or chest pain.  He 

states that she is little concern of cardiac issues as she has family history 

of CAD.





Review of Systems


General: Patient denies fevers, chills recent, and recent travel


HEENT: Patient denies headache, sore throat, difficulty swallowing.  


Cardiovascular: Denies chest pain.  Denies sensation of heart beating rapidly 

or irregularly.  No syncope.  


Respiratory: Complains of shortness of breath.  Denies inspirational chest 

discomfort.  Denies coughing wheezing or hemoptysis.  


GI: Patient denies nausea, vomiting, diarrhea, abdominal pain, bloody stools.


Musculoskeletal: Patient denies joint pain or edema.  Denies calf pain or edema.


Neurovascular: Patient denies numbness, tingling, weakness in extremities.  

Denies headache.


Endocrine: Denies polyuria and polydipsia.


Hematologic: Denies easy bruising.


Skin: Denies rash or itching.





Past Family Social History


Allergies:  


Coded Allergies:  


     Adhesives (Verified  Allergy, Intermediate, Rash, 2/13/17)


 more the glue


     Betadine (Unverified  Allergy, Intermediate, SEVERE PEELING OF SKIN, 2/13/ 17)


     Mineral Oil (Unverified  Allergy, Intermediate, RASH- ITCHING, 2/13/17)


     Seafood (Unverified  Allergy, Intermediate, THROAT BURNING- HIVES, 2/13/17)


     Vitamin C (Unverified  Allergy, Intermediate, THROAT SORE- VAGINAL 

IRRITATION, 2/13/17)


     Epinephrine (Verified  Allergy, Unknown, 2/13/17)


 INTERMEDIATE REACTION - EXTREME DIARRHEA; "PASSED OUT"


     Erythromycin (Verified  Allergy, Unknown, 2/13/17)


 INTERMEDIATE REACTION- HIVES


     Sulfa (Verified  Allergy, Unknown, 2/13/17)


 INTERMEDIATE REACTION ; BODY TEMPERATURE LOWERS


Past Medical History


Recent TIA versus CVA.  Hyperlipidemia.  GERD.  Postmenopausal.


Past Surgical History


Noncontributory.


Reported Medications





Reported Meds & Active Scripts


Active


Lipitor (Atorvastatin Calcium) 20 Mg Tab 20 Mg PO HS


[Aspirin Ec] 325 MG Tabec 325 Mg PO DAILY


Reported


Metrogel Topical (Metronidazole Topical) 1 % Gel 1 Applic TOPICAL DAILY HS


[Multivitamin]   1 Tab PO DAILY


[Vitamin B1 ]   100 Mg PO DAILY


Omeprazole 20 Mg Cap 20 Mg PO DAILY


Magnesium Oxide 250 Mg Tab 250 Mg PO DAILY


Vitamin D-3 (Cholecalciferol) 2,000 Unit Tab 3,000 Units PO BID


Bone Meal (Bone Meal W/ Vitamin D) 1 Tab 2 PO BID


Estrace Vaginal (Estradiol) 0.01% Cream 0.01 Appl VAGINAL  PRN


Active Ordered Medications





 Current Medications








 Medications


  (Trade)  Dose


 Ordered  Sig/Rand


 Route  Start Time


 Stop Time Status Last Admin


 


  (NS Flush)  2 ml  UNSCH  PRN


 IVF  2/13/17 19:30


     


 


 


  (NS Flush)  2 ml  UNSCH  PRN


 IVF  2/13/17 22:00


     


 


 


  (NS Flush)  2 ml  BID


 IVF  2/14/17 09:00


     


 


 


  (Tylenol)  500 mg  Q4H  PRN


 PO  2/13/17 22:00


     


 


 


  (Aspirin)  325 mg  DAILY


 PO  2/14/17 09:00


     


 








Family History


Family history of CAD.


Social History


Patient does not smoke or use illicit drugs.  Denies alcohol abuse.





Physical Exam


Vital Signs





 Vital Signs








  Date Time  Temp Pulse Resp B/P Pulse Ox O2 Delivery O2 Flow Rate FiO2


 


2/14/17 06:08 98.4 75 18 118/63 96   


 


2/14/17 02:51  66      


 


2/14/17 01:06  72      


 


2/14/17 01:05  72      


 


2/14/17 00:41 97.2 72 18 113/67 94   


 


2/13/17 22:02  77 18 129/74 95 Room Air  


 


2/13/17 20:00     95   


 


2/13/17 19:47  75 16 153/70 96 Room Air  


 


2/13/17 19:35     97 Room Air  


 


2/13/17 19:24  76 16 199/87 98 Room Air  


 


2/13/17 19:04 97.8 68 16 177/81 97 Room Air  








Physical Exam


GENERAL: This is a well-nourished, well-developed patient, in no apparent 

distress.  Patient speaks in clear complete sentences.  Patient is pleasant.


HEENT: Head is atraumatic and normocephalic.  Neck is supple without 

lymphadenopathy and trachea is midline.  No JVD or carotid bruits.


CARDIOVASCULAR: Regular rate and rhythm without murmurs, gallops, or rubs. 


RESPIRATORY: Clear to auscultation. Breath sounds equal bilaterally. No wheezes

, rales, or rhonchi.  Chest wall is nontender.  No use of accessory muscles.


GASTROINTESTINAL: Abdomen is nontender, nondistended.  Abdomen soft.  No 

obvious pulsatile mass or bruit.  No CVA tenderness.  Strong femoral pulses 

bilaterally.  Normal bowel sounds in all quadrants.


MUSCULOSKELETAL: Patient is moving upper and lower extremities freely.  No calf 

tenderness or edema, no Homans sign.  Strong pulses in upper and lower 

extremities.


NEUROLOGICAL: Patient is alert and oriented.  Cranial nerves 2-12 are grossly 

intact.  No focal deficits and speech is clear.


SKIN: No rash and turgor is normal.


Laboratory





Laboratory Tests








Test 2/13/17 2/13/17 2/14/17





 19:30 22:50 01:15


 


White Blood Count 9.7   


 


Red Blood Count 4.55   


 


Hemoglobin 13.7   


 


Hematocrit 40.9   


 


Mean Corpuscular Volume 89.9   


 


Mean Corpuscular Hemoglobin 30.2   


 


Mean Corpuscular Hemoglobin 33.6   





Concent   


 


Red Cell Distribution Width 12.1   


 


Platelet Count 242   


 


Mean Platelet Volume 9.8   


 


Neutrophils (%) (Auto) 63.5   


 


Lymphocytes (%) (Auto) 24.5   


 


Monocytes (%) (Auto) 8.4   


 


Eosinophils (%) (Auto) 2.9   


 


Basophils (%) (Auto) 0.7   


 


Neutrophils # (Auto) 6.2   


 


Lymphocytes # (Auto) 2.4   


 


Monocytes # (Auto) 0.8   


 


Eosinophils # (Auto) 0.3   


 


Basophils # (Auto) 0.1   


 


CBC Comment DIFF FINAL   


 


Differential Comment    


 


Prothrombin Time 10.5   


 


Prothromb Time International 1.0   





Ratio   


 


Activated Partial 26.4   





Thromboplast Time   


 


D-Dimer Quantitative (PE/DVT) 0.70   


 


Sodium Level 141   


 


Potassium Level 3.8   


 


Chloride Level 104   


 


Carbon Dioxide Level 30.8   


 


Anion Gap 6   


 


Blood Urea Nitrogen 20   


 


Creatinine 0.90   


 


Estimat Glomerular Filtration 62   





Rate   


 


Random Glucose 103   


 


Calcium Level 9.2   


 


Magnesium Level 2.1   


 


Total Bilirubin 0.3   


 


Aspartate Amino Transf 26   





(AST/SGOT)   


 


Alanine Aminotransferase 37   





(ALT/SGPT)   


 


Alkaline Phosphatase 74   


 


Total Creatine Kinase 249   192 


 


Creatine Kinase MB 5.4   3.4 


 


Creatine Kinase MB % 2.2   


 


Troponin I LESS THAN 0.02  0.02  LESS THAN 0.02 


 


B-Type Natriuretic Peptide 2   


 


Total Protein 7.5   


 


Albumin 3.9   








Result Diagram:  


2/13/17 1930 2/13/17 1930





Imaging





Last Impressions








Lung Scan-[facility] Nuclear [facility] 2/13/17 2049 Signed





Impressions: 





 Service Date/Time:  Monday, February 13, 2017 22:18 - CONCLUSION: Low 





 probability for pulmonary embolus.     Stalin Mora MD 


 


Chest X-Ray 2/13/17 1928 Signed





Impressions: 





 Service Date/Time:  Monday, February 13, 2017 19:46 - CONCLUSION: No acute 





 disease.       Stalin Mora MD 








Course


EKGs have sinus rhythm without significant ST segment depressions or elevations.





Assessment and Plan


Assessment and Plan


* Shortness of breath: Patient's symptoms have resolved.  She was concerned of 

cardiac issues.  Patient has been seen by Dr. Carrera cardiology 

adjustments on her and will undergo a Macario protocol ETT.  She'll be discharged 

home if her stress test were to be nonischemic with instructions to follow-up 

with her primary care physician as well as her neurologist.


* Hyperlipidemia: Continue current medications.


* Recent CVA/TIA: Continue current medications and follow-up with Dr. Briceño.








Neil Rider Feb 14, 2017 07:59

## 2017-02-14 NOTE — HHI.DCPOC
Discharge Care Plan


Diagnosis:  


(1) Shortness of breath


(2) Hyperlipidemia


(3) History of CVA (cerebrovascular accident)


Goals to Promote Your Health


* To prevent worsening of your condition and complications


* To maintain your health at the optimal level


Directions to Meet Your Goals


*** Take your medications as prescribed


*** Follow your dietary instruction


*** Follow activity as directed








*** Keep your appointments as scheduled


*** Take your immunizations and boosters as scheduled


*** If your symptoms worsen call your PCP, if no PCP go to Urgent Care Center 

or Emergency Room***


*** Smoking is Dangerous to Your Health. Avoid second hand smoke***


***Call the 24-hour hour crisis hotline for domestic abuse at [phone]***








Neil Rider Feb 14, 2017 09:10

## 2017-08-16 ENCOUNTER — HOSPITAL ENCOUNTER (OUTPATIENT)
Dept: HOSPITAL 17 - NEPC | Age: 67
Setting detail: OBSERVATION
LOS: 1 days | Discharge: HOME | End: 2017-08-17
Payer: MEDICARE

## 2017-08-16 VITALS — HEIGHT: 61 IN | WEIGHT: 131.4 LBS | BODY MASS INDEX: 24.81 KG/M2

## 2017-08-16 VITALS
SYSTOLIC BLOOD PRESSURE: 134 MMHG | TEMPERATURE: 97.7 F | OXYGEN SATURATION: 96 % | HEART RATE: 62 BPM | DIASTOLIC BLOOD PRESSURE: 76 MMHG | RESPIRATION RATE: 20 BRPM

## 2017-08-16 VITALS
OXYGEN SATURATION: 99 % | HEART RATE: 62 BPM | SYSTOLIC BLOOD PRESSURE: 142 MMHG | RESPIRATION RATE: 16 BRPM | DIASTOLIC BLOOD PRESSURE: 74 MMHG

## 2017-08-16 VITALS — OXYGEN SATURATION: 99 %

## 2017-08-16 VITALS
OXYGEN SATURATION: 98 % | HEART RATE: 62 BPM | DIASTOLIC BLOOD PRESSURE: 77 MMHG | RESPIRATION RATE: 16 BRPM | SYSTOLIC BLOOD PRESSURE: 148 MMHG

## 2017-08-16 VITALS
DIASTOLIC BLOOD PRESSURE: 80 MMHG | TEMPERATURE: 98.6 F | OXYGEN SATURATION: 99 % | SYSTOLIC BLOOD PRESSURE: 158 MMHG | HEART RATE: 68 BPM | RESPIRATION RATE: 14 BRPM

## 2017-08-16 VITALS
HEART RATE: 62 BPM | DIASTOLIC BLOOD PRESSURE: 74 MMHG | RESPIRATION RATE: 16 BRPM | SYSTOLIC BLOOD PRESSURE: 148 MMHG | OXYGEN SATURATION: 99 %

## 2017-08-16 VITALS
HEART RATE: 58 BPM | RESPIRATION RATE: 18 BRPM | DIASTOLIC BLOOD PRESSURE: 73 MMHG | SYSTOLIC BLOOD PRESSURE: 145 MMHG | OXYGEN SATURATION: 99 % | TEMPERATURE: 98.3 F

## 2017-08-16 VITALS — SYSTOLIC BLOOD PRESSURE: 137 MMHG | DIASTOLIC BLOOD PRESSURE: 78 MMHG

## 2017-08-16 DIAGNOSIS — I49.9: ICD-10-CM

## 2017-08-16 DIAGNOSIS — Z79.899: ICD-10-CM

## 2017-08-16 DIAGNOSIS — Z79.82: ICD-10-CM

## 2017-08-16 DIAGNOSIS — G45.8: ICD-10-CM

## 2017-08-16 DIAGNOSIS — Z86.73: ICD-10-CM

## 2017-08-16 DIAGNOSIS — R20.2: Primary | ICD-10-CM

## 2017-08-16 DIAGNOSIS — K21.9: ICD-10-CM

## 2017-08-16 DIAGNOSIS — E78.5: ICD-10-CM

## 2017-08-16 LAB
ALP SERPL-CCNC: 106 U/L (ref 45–117)
ALT SERPL-CCNC: 87 U/L (ref 10–53)
ANION GAP SERPL CALC-SCNC: 7 MEQ/L (ref 5–15)
APTT BLD: 26.1 SEC (ref 24.3–30.1)
AST SERPL-CCNC: 48 U/L (ref 15–37)
BACTERIA #/AREA URNS HPF: (no result) /HPF
BASOPHILS # BLD AUTO: 0.1 TH/MM3 (ref 0–0.2)
BASOPHILS NFR BLD: 0.9 % (ref 0–2)
BILIRUB SERPL-MCNC: 0.4 MG/DL (ref 0.2–1)
BUN SERPL-MCNC: 16 MG/DL (ref 7–18)
CHLORIDE SERPL-SCNC: 108 MEQ/L (ref 98–107)
COLOR UR: (no result)
COMMENT (UR): (no result)
CULTURE IF INDICATED: (no result)
EOSINOPHIL # BLD: 0.3 TH/MM3 (ref 0–0.4)
EOSINOPHIL NFR BLD: 3.2 % (ref 0–4)
ERYTHROCYTE [DISTWIDTH] IN BLOOD BY AUTOMATED COUNT: 12.3 % (ref 11.6–17.2)
GFR SERPLBLD BASED ON 1.73 SQ M-ARVRAT: 69 ML/MIN (ref 89–?)
GLUCOSE UR STRIP-MCNC: (no result) MG/DL
HCO3 BLD-SCNC: 28.1 MEQ/L (ref 21–32)
HCT VFR BLD CALC: 41.5 % (ref 35–46)
HEMO FLAGS: (no result)
HEMOGLOBIN A1A: 0.9 %
HEMOGLOBIN A1B: 1.7 %
HEMOGLOBIN AO: 85.4 %
HEMOGLOBIN LA1C: 1.8 %
HEMOGLOBIN P3: 3.8 %
HGB UR QL STRIP: (no result)
INR PPP: 0.9 RATIO
KETONES UR STRIP-MCNC: (no result) MG/DL
LYMPHOCYTES # BLD AUTO: 2.2 TH/MM3 (ref 1–4.8)
LYMPHOCYTES NFR BLD AUTO: 25.5 % (ref 9–44)
MCH RBC QN AUTO: 30.6 PG (ref 27–34)
MCHC RBC AUTO-ENTMCNC: 33.5 % (ref 32–36)
MCV RBC AUTO: 91.3 FL (ref 80–100)
MONOCYTES NFR BLD: 9.5 % (ref 0–8)
NEUTROPHILS # BLD AUTO: 5.1 TH/MM3 (ref 1.8–7.7)
NEUTROPHILS NFR BLD AUTO: 60.9 % (ref 16–70)
NITRITE UR QL STRIP: (no result)
PLATELET # BLD: 217 TH/MM3 (ref 150–450)
POTASSIUM SERPL-SCNC: 3.9 MEQ/L (ref 3.5–5.1)
PROTHROMBIN TIME: 10.3 SEC (ref 9.8–11.6)
RBC # BLD AUTO: 4.54 MIL/MM3 (ref 4–5.3)
SODIUM SERPL-SCNC: 143 MEQ/L (ref 136–145)
SP GR UR STRIP: 1 (ref 1–1.03)
SQUAMOUS #/AREA URNS HPF: 1 /HPF (ref 0–5)
T4 FREE SERPL-MCNC: 0.9 NG/DL (ref 0.76–1.46)
WBC # BLD AUTO: 8.4 TH/MM3 (ref 4–11)

## 2017-08-16 PROCEDURE — 81001 URINALYSIS AUTO W/SCOPE: CPT

## 2017-08-16 PROCEDURE — 70551 MRI BRAIN STEM W/O DYE: CPT

## 2017-08-16 PROCEDURE — 82948 REAGENT STRIP/BLOOD GLUCOSE: CPT

## 2017-08-16 PROCEDURE — 93880 EXTRACRANIAL BILAT STUDY: CPT

## 2017-08-16 PROCEDURE — 83036 HEMOGLOBIN GLYCOSYLATED A1C: CPT

## 2017-08-16 PROCEDURE — 84443 ASSAY THYROID STIM HORMONE: CPT

## 2017-08-16 PROCEDURE — 93225 XTRNL ECG REC<48 HRS REC: CPT

## 2017-08-16 PROCEDURE — 71010: CPT

## 2017-08-16 PROCEDURE — 84439 ASSAY OF FREE THYROXINE: CPT

## 2017-08-16 PROCEDURE — 93306 TTE W/DOPPLER COMPLETE: CPT

## 2017-08-16 PROCEDURE — 85025 COMPLETE CBC W/AUTO DIFF WBC: CPT

## 2017-08-16 PROCEDURE — 85730 THROMBOPLASTIN TIME PARTIAL: CPT

## 2017-08-16 PROCEDURE — 80076 HEPATIC FUNCTION PANEL: CPT

## 2017-08-16 PROCEDURE — 93005 ELECTROCARDIOGRAM TRACING: CPT

## 2017-08-16 PROCEDURE — G0378 HOSPITAL OBSERVATION PER HR: HCPCS

## 2017-08-16 PROCEDURE — 80053 COMPREHEN METABOLIC PANEL: CPT

## 2017-08-16 PROCEDURE — 99285 EMERGENCY DEPT VISIT HI MDM: CPT

## 2017-08-16 PROCEDURE — 93226 XTRNL ECG REC<48 HR SCAN A/R: CPT

## 2017-08-16 PROCEDURE — 80061 LIPID PANEL: CPT

## 2017-08-16 PROCEDURE — 85610 PROTHROMBIN TIME: CPT

## 2017-08-16 PROCEDURE — 70450 CT HEAD/BRAIN W/O DYE: CPT

## 2017-08-16 PROCEDURE — 97161 PT EVAL LOW COMPLEX 20 MIN: CPT

## 2017-08-16 PROCEDURE — 70544 MR ANGIOGRAPHY HEAD W/O DYE: CPT

## 2017-08-16 RX ADMIN — INSULIN ASPART SCH: 100 INJECTION, SOLUTION INTRAVENOUS; SUBCUTANEOUS at 22:23

## 2017-08-16 RX ADMIN — ASPIRIN SCH MG: 325 TABLET ORAL at 09:45

## 2017-08-16 RX ADMIN — CLOPIDOGREL BISULFATE SCH MG: 75 TABLET, FILM COATED ORAL at 22:20

## 2017-08-16 RX ADMIN — PHENYTOIN SODIUM SCH MLS/HR: 50 INJECTION INTRAMUSCULAR; INTRAVENOUS at 20:27

## 2017-08-16 RX ADMIN — SODIUM CHLORIDE, PRESERVATIVE FREE SCH ML: 5 INJECTION INTRAVENOUS at 22:20

## 2017-08-16 RX ADMIN — PHENYTOIN SODIUM SCH MLS/HR: 50 INJECTION INTRAMUSCULAR; INTRAVENOUS at 10:16
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
## 2017-08-16 NOTE — RADRPT
EXAM DATE/TIME:  08/16/2017 07:52 

 

[facility] COMPARISON:     

CHEST SINGLE AP, February 13, 2017, 19:46.

 

                     

INDICATIONS :     

Numbness and chest pain on left side.

                     

 

MEDICAL HISTORY :            

Gastroesophageal reflux disease. Stroke Heart murmur.   

 

SURGICAL HISTORY :        

Appendectomy. Exploratory abdominal surgery and nasal surgery.

 

ENCOUNTER:     

Initial                                        

 

ACUITY:     

1 day      

 

PAIN SCORE:     

2/10

 

LOCATION:     

Left chest 

 

FINDINGS:     

A single view of the chest demonstrates the lungs to be symmetrically aerated without evidence of mas
s, infiltrate or effusion.  The cardiomediastinal contours are unremarkable.  Osseous structures are 
intact.

 

CONCLUSION:     Normal examination.  

 

 

 

 Bolivar Fierro Jr., MD on August 16, 2017 at 8:10           

Board Certified Radiologist.

 This report was verified electronically.

## 2017-08-16 NOTE — EKG
Date Performed: 08/16/2017       Time Performed: 08:37:38

 

PTAGE:      67 years

 

EKG:      Sinus rhythm 

 

 WITH SINUS ARRHYTHMIA NORMAL ECG

 

PREVIOUS TRACING       : 02/14/2017 01.23 Compared to prior tracing no significant change

 

DOCTOR:   Mauro Murillo  Interpretating Date/Time  08/16/2017 14:43:28
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
## 2017-08-16 NOTE — ECHRPT
Indication:   CVA/TIA

 

 CONCLUSIONS

 Normal left ventricular size. 

 Wall thickness is measured at the upper limits of normal. 

 The left ventricular systolic function is normal with an estimated ejection fraction in the range of
 55-60%. 

 Trace mitral valve regurgitation. 

 

 BP:  158   / 80      HR: 68                       Rhythm:           Sinus

 

 MEASUREMENTS  (Male / Female) Normal Values       Technical Quality:Good

 2D ECHO

 LV Diastolic Diameter PLAX        3.8 cm                4.2 - 5.9 / 3.9 - 5.3 cm

 LV Systolic Diameter PLAX         2.8 cm                

 IVS Diastolic Thickness           1.5 cm                0.6 - 1.0 / 0.6 - 0.9 cm

 LVPW Diastolic Thickness          0.7 cm                0.6 - 1.0 / 0.6 - 0.9 cm

 LV Relative Wall Thickness        0.6                   

 LA Systolic Diameter LX           3.3 cm                3.0 - 4.0 / 2.7 - 3.8 cm

 

 M-MODE

 Aortic Root Diameter MM           2.7 cm                

 AV Cusp Separation MM             1.8 cm                

 

 DOPPLER

 AV Peak Velocity                  160.0 cm/s            

 AV Peak Gradient                  10.2 mmHg             

 LVOT Peak Velocity                77.5 cm/s             

 LVOT Peak Gradient                2.4 mmHg              

 Mitral E Point Velocity           56.3 cm/s             

 Mitral A Point Velocity           90.3 cm/s             

 Mitral E to A Ratio               0.6                   

 TR Peak Velocity                  190.0 cm/s            

 TR Peak Gradient                  14.4 mmHg             

 

 

 FINDINGS

 

 LEFT VENTRICLE

 Normal left ventricular size. 

 Wall thickness is measured at the upper limits of normal. 

 The left ventricular systolic function is normal with an estimated ejection fraction in the range of
 55-60%. 

 

 RIGHT VENTRICLE

 Normal right ventricular size and systolic function.  

 

 LEFT ATRIUM

 The left atrial size is normal.  

 

 RIGHT ATRIUM

 The right atrial size is normal.  

 

 ATRIAL SEPTUM

 Normal atrial septal thickness without atrial level shunting by limited color doppler interrogation.
  

 

 AORTA

 The aortic root and proximal ascending aorta are normal in size on limited imaging.  

 

 MITRAL VALVE

 Trace mitral valve regurgitation. 

 

 AORTIC VALVE

 Trileaflet aortic valve. No aortic valve stenosis or regurgitation.  

 

 TRICUSPID VALVE

 Structurally normal tricuspid valve. No tricuspid valve stenosis or regurgitation.  

 

 PULMONARY VALVE

 The pulmonary valve is not well visualized.  

 

 VESSELS

 The inferior vena cava is normal in size.  

 

 PERICARDIUM

 No pericardial effusion.  

 

 

 

 

  Devendra Albarran MD

  (Electronically Signed)

  Final Date:16 August 2017 18:11

## 2017-08-16 NOTE — RADRPT
EXAM DATE/TIME:  08/16/2017 08:19 

 

[facility] COMPARISON:     

CT BRAIN W/O CONTRAST, February 09, 2017, 7:50.

 

 

INDICATIONS :     

Left side weakness and numbness this morning

                      

 

RADIATION DOSE:     

31.39 CTDIvol (mGy) 

 

 

 

MEDICAL HISTORY :       

TIA's

 

SURGICAL HISTORY :      

Appendectomy. 

 

ENCOUNTER:      

Initial

 

ACUITY:      

1 day

 

PAIN SCALE:      

0/10

 

LOCATION:        

cranial 

 

TECHNIQUE:     

Multiple contiguous axial images were obtained of the head.  Using automated exposure control and adj
ustment of the mA and/or kV according to patient size, radiation dose was kept as low as reasonably a
chievable to obtain optimal diagnostic quality images.   DICOM format image data is available electro
nically for review and comparison.  

 

FINDINGS:     

 

CEREBRUM:     

The ventricles are normal for age.  No evidence of midline shift, mass lesion, hemorrhage or acute in
farction.  No extra-axial fluid collections are seen.

 

POSTERIOR FOSSA:     

The cerebellum and brainstem are intact.  The 4th ventricle is midline.  The cerebellopontine angle i
s unremarkable.

 

EXTRACRANIAL:     

The visualized portion of the orbits is intact.

 

SKULL:     

The calvaria is intact.  No evidence of skull fracture.

 

CONCLUSION:     

Normal examination.  

 

 

 

 Bolivar Fierro Jr., MD on August 16, 2017 at 9:19           

Board Certified Radiologist.

 This report was verified electronically.

## 2017-08-16 NOTE — RADRPT
EXAM DATE/TIME:  08/16/2017 10:11 

 

[facility] COMPARISON:     

US CAROTID ARTERIES, February 09, 2017, 14:58.

        

 

 

INDICATIONS :     

Transient ischemic attack.

                     

 

MEDICAL HISTORY :     

Pregnancy. Arthritis. Gastroesophageal reflux disease. Cerebrovascular accident. Anticoagulant therap
y, Aspirin. 

 

SURGICAL HISTORY :     

Tonsillectomy. Appendectomy.   Nasal surgery. Exploratory laparotomy. Left hand cyst removal.

 

ENCOUNTER:     

Subsequent

 

ACUITY:     

1 day

 

PAIN SCORE:     

0/10

 

LOCATION:     

Bilateral neck 

                     

PEAK SYSTOLIC VELOCITIES (cm/sec):

 

ICA/CCA RATIO:                    

Right: 0.9     Left: 0.7

 

ICA:                          

Right: 76     Left: 84

 

CCA:                          

Right: 86     Left: 122

 

ECA:                           

Right: 76     Left: 62

 

VERTEBRAL:           

Right: 48 antegrade     Left: 36 antegrade

             

Elevated flow velocities and ICA/CCA ratios have been found to correlate with increased degrees of

vessel stenosis, calculated as percentage of diameter relative to a normal segment of distal ICA/CCA

 

FINDINGS:     

 

RIGHT CAROTID:     

No significant stenosis is visualized. Calcific plaque is present in the bowl with mild shadowing.  T
he waveforms are within normal limits.

 

LEFT CAROTID:     

No significant stenosis is visualized.  The waveforms are within normal limits.

 

VERTEBRAL ARTERIES:  

Antegrade flow is seen in both vertebral arteries.

 

MISCELLANEOUS:     

There is a solid hypoechoic structure in right mid lobe of the thyroid measuring 2.1 x 1.1 x 2.2 cm. 
There is a complex cystic structure in the isthmus measuring 10 x 8 x 10 mm.

 

CONCLUSION:     

1. Calcific plaque in the carotid bulb with no significant stenosis.

2. Multiple thyroid lesions.

 

 

 

 Austyn Lacey MD on August 16, 2017 at 10:53           

Board Certified Radiologist.

 This report was verified electronically.

## 2017-08-17 VITALS
RESPIRATION RATE: 19 BRPM | HEART RATE: 64 BPM | TEMPERATURE: 98 F | SYSTOLIC BLOOD PRESSURE: 119 MMHG | DIASTOLIC BLOOD PRESSURE: 68 MMHG | OXYGEN SATURATION: 94 %

## 2017-08-17 VITALS
HEART RATE: 62 BPM | TEMPERATURE: 98 F | SYSTOLIC BLOOD PRESSURE: 108 MMHG | DIASTOLIC BLOOD PRESSURE: 59 MMHG | RESPIRATION RATE: 18 BRPM | OXYGEN SATURATION: 97 %

## 2017-08-17 VITALS
DIASTOLIC BLOOD PRESSURE: 79 MMHG | RESPIRATION RATE: 18 BRPM | HEART RATE: 63 BPM | SYSTOLIC BLOOD PRESSURE: 121 MMHG | TEMPERATURE: 97.8 F | OXYGEN SATURATION: 96 %

## 2017-08-17 VITALS
DIASTOLIC BLOOD PRESSURE: 53 MMHG | RESPIRATION RATE: 19 BRPM | OXYGEN SATURATION: 97 % | SYSTOLIC BLOOD PRESSURE: 117 MMHG | HEART RATE: 70 BPM | TEMPERATURE: 98.4 F

## 2017-08-17 VITALS — OXYGEN SATURATION: 97 %

## 2017-08-17 LAB
ALP SERPL-CCNC: 87 U/L (ref 45–117)
ALT SERPL-CCNC: 72 U/L (ref 10–53)
AST SERPL-CCNC: 32 U/L (ref 15–37)
BILIRUB INDIRECT SERPL-MCNC: 0.5 MG/DL (ref 0–0.8)
BILIRUB SERPL-MCNC: 0.7 MG/DL (ref 0.2–1)
HDLC SERPL-MCNC: 45.6 MG/DL (ref 40–60)
LDLC SERPL-MCNC: 53 MG/DL (ref 0–99)

## 2017-08-17 RX ADMIN — CLOPIDOGREL BISULFATE SCH MG: 75 TABLET, FILM COATED ORAL at 08:48

## 2017-08-17 RX ADMIN — ASPIRIN SCH MG: 325 TABLET ORAL at 08:48

## 2017-08-17 RX ADMIN — SODIUM CHLORIDE, PRESERVATIVE FREE SCH ML: 5 INJECTION INTRAVENOUS at 09:00

## 2017-08-17 RX ADMIN — INSULIN ASPART SCH: 100 INJECTION, SOLUTION INTRAVENOUS; SUBCUTANEOUS at 11:00

## 2017-08-17 RX ADMIN — INSULIN ASPART SCH: 100 INJECTION, SOLUTION INTRAVENOUS; SUBCUTANEOUS at 06:19

## 2017-08-17 NOTE — HHI.PR
Review/Management


Diagnosis


Sensory TIA


Plan


continue plavix 75 mg daily and stop asa


ok from neuro standpoint to discharge today and follow up with me in 2-3 weeks


Diagnosis/Plan:  





Subjective


Subjective Comments


No acute events reported


left sided numbness resolved and no new c/o


Active Medications





 Current Medications








 Medications


  (Trade)  Dose


 Ordered  Sig/Rand


 Route  Start Time


 Stop Time Status Last Admin


 


  (NS 1000 ml Inj)  1,000 ml @ 


 70 mls/hr  [id number]


 IV  8/16/17 09:37


    8/16/17 20:27


 


 


  (Vasotec  Inj)  1.25 mg  Q4H  PRN


 IV  8/16/17 09:45


     


 


 


  (Trandate Inj)  10 mg  Q2H  PRN


 IV  8/16/17 09:45


     


 


 


  (Aspirin)  325 mg  DAILY


 PO  8/16/17 09:45


    8/17/17 08:48


 


 


  (Pravachol)  40 mg  HS


 PO  8/16/17 21:00


    8/16/17 20:27


 


 


  (Protonix)  20 mg  DAILY


 PO  8/17/17 09:00


    8/17/17 08:48


 


 


  (NS Flush)  2 ml  BID


 IV FLUSH  8/16/17 21:00


     


 


 


  (NS Flush)  2 ml  UNSCH  PRN


 IV FLUSH  8/16/17 20:30


     


 


 


  (Plavix)  75 mg  DAILY


 PO  8/16/17 20:30


    8/17/17 08:48


 


 


  (NovoLOG


 SUPPLEMENTAL


 SCALE)  1  ACHS


 SQ  8/16/17 21:00


     


 


 


  (D50w (Vial) Inj)  50 ml  UNSCH  PRN


 IV PUSH  8/16/17 20:30


     


 


 


  (Glucagon Inj)  1 mg  UNSCH  PRN


 OTHER  8/16/17 20:30


     


 








Allergies





 Allergies


Coded Allergies


  Fish Containing Products (Verified Allergy, Intermediate, THROAT BURNING- 

HIVES, 8/16/17)


  adhesive (Verified Allergy, Intermediate, Rash, 8/16/17)


  ascorbic acid (Verified Allergy, Intermediate, THROAT SORE- VAGINAL IRRITATION

, 8/16/17)


  mineral oil (Verified Allergy, Intermediate, RASH- ITCHING, 8/16/17)


  povidone-iodine (Verified Allergy, Intermediate, SEVERE PEELING OF SKIN, 8/16/ 17)


  Sulfa (Sulfonamide Antibiotics) (Verified Allergy, Unknown, 8/16/17)


  epinephrine (Verified Allergy, Unknown, 8/16/17)


  erythromycin base (Verified Allergy, Unknown, 8/16/17)





Exam


I&O / VS











 8/16/17 8/16/17 8/17/17





 14:59 22:59 06:59


 


Intake Total   557 ml


 


Balance   557 ml


 


   


 


Intake IV Total   557 ml


 


# Voids  4 2


 


# Bowel Movements   0








 Vital Signs








  Date Time  Temp Pulse Resp B/P Pulse Ox O2 Delivery O2 Flow Rate FiO2


 


8/17/17 14:04     97   21


 


8/17/17 12:07 98.4 70 19 117/53 97   


 


8/17/17 08:00 98.0 64 19 119/68 94   


 


8/17/17 05:00 98.0 62 18 108/59 97   


 


8/17/17 00:00 97.8 63 18 121/79 96   


 


8/16/17 20:32 97.7 62 20 134/76 96   


 


8/16/17 15:52 98.3 58 18 145/73 99   








Exam Comments


alert, speech normal


CN intact


MOTOR 5/5 BUE and BLE


senory--normal


Gait normal





Objective


Micro and Labs





Laboratory Tests








Test 8/17/17





 08:26


 


Total Bilirubin 0.7 


 


Direct Bilirubin 0.2 


 


Indirect Bilirubin 0.5 


 


Aspartate Amino Transf 32 





(AST/SGOT) 


 


Alanine Aminotransferase 72 





(ALT/SGPT) 


 


Alkaline Phosphatase 87 


 


Total Protein 6.5 


 


Albumin 3.3 


 


Triglycerides Level 136 


 


Cholesterol Level 126 


 


LDL Cholesterol 53 


 


HDL Cholesterol 45.6 


 


Cholesterol/HDL Ratio 2.76 








Diagnostic Tests


carotid US normal








Mehdi Briceño PhD MD Aug 17, 2017 15:20

## 2017-08-17 NOTE — HHI.DCPOC
Discharge Care Plan


Diagnosis:  


(1) Paresthesia of left arm and leg


(2) History of CVA (cerebrovascular accident)


(3) Hyperlipidemia


Goals to Promote Your Health


* To prevent worsening of your condition and complications


* To maintain your health at the optimal level


Directions to Meet Your Goals


*** Take your medications as prescribed


*** Follow your dietary instruction


*** Follow activity as directed








*** Keep your appointments as scheduled


*** Take your immunizations and boosters as scheduled


*** If your symptoms worsen call your PCP, if no PCP go to Urgent Care Center 

or Emergency Room***


*** Smoking is Dangerous to Your Health. Avoid second hand smoke***


***Call the 24-hour hour crisis hotline for domestic abuse at 1-977.651.1172***











Tiarra Guzman Aug 17, 2017 14:36


Fortino Moralez DO Aug 21, 2017 00:55

## 2017-08-17 NOTE — PD.CONS
Assessment and Plan


Assessment


Consult received per stroke order set.  EMR reviewed.





MRI negative for acute stroke.  Neurology consult reviewed and indicates TIA.





Consult deferred due to no acute stroke.  Please reconsult as appropriate.





Thank you.








Komal Rinaldi MD Aug 17, 2017 15:06

## 2017-08-17 NOTE — HHI.PR
Subjective


Remarks


Pt feeling much better today


Denies any more of the numbness or tingling


Sara any dizziness or weakness





Objective


Vitals





 Vital Signs








  Date Time  Temp Pulse Resp B/P Pulse Ox O2 Delivery O2 Flow Rate FiO2


 


8/17/17 14:04     97   21


 


8/17/17 12:07 98.4 70 19 117/53 97   


 


8/17/17 08:00 98.0 64 19 119/68 94   


 


8/17/17 05:00 98.0 62 18 108/59 97   


 


8/17/17 00:00 97.8 63 18 121/79 96   


 


8/16/17 20:32 97.7 62 20 134/76 96   


 


8/16/17 15:52 98.3 58 18 145/73 99   














 8/16/17 8/16/17 8/17/17





 15:00 23:00 07:00


 


Intake Total   557 ml


 


Balance   557 ml


 


   


 


Intake IV Total   557 ml


 


# Voids  4 2


 


# Bowel Movements   0








Result Diagram:  


8/16/17 0715                                                                   

             8/16/17 0715





Other Results





 Laboratory Tests








Test 8/16/17 8/16/17 8/17/17





 07:15 07:45 08:26


 


White Blood Count 8.4 TH/MM3  


 


Red Blood Count 4.54 MIL/MM3  


 


Hemoglobin 13.9 GM/DL  


 


Hematocrit 41.5 %  


 


Mean Corpuscular Volume 91.3 FL  


 


Mean Corpuscular Hemoglobin 30.6 PG  


 


Mean Corpuscular Hemoglobin 33.5 %  





Concent   


 


Red Cell Distribution Width 12.3 %  


 


Platelet Count 217 TH/MM3  


 


Mean Platelet Volume 10.5 FL  


 


Neutrophils (%) (Auto) 60.9 %  


 


Lymphocytes (%) (Auto) 25.5 %  


 


Monocytes (%) (Auto) 9.5 %  


 


Eosinophils (%) (Auto) 3.2 %  


 


Basophils (%) (Auto) 0.9 %  


 


Neutrophils # (Auto) 5.1 TH/MM3  


 


Lymphocytes # (Auto) 2.2 TH/MM3  


 


Monocytes # (Auto) 0.8 TH/MM3  


 


Eosinophils # (Auto) 0.3 TH/MM3  


 


Basophils # (Auto) 0.1 TH/MM3  


 


CBC Comment DIFF FINAL   


 


Differential Comment    


 


Prothrombin Time 10.3 SEC  


 


Prothromb Time International 0.9 RATIO  





Ratio   


 


Activated Partial 26.1 SEC  





Thromboplast Time   


 


Sodium Level 143 MEQ/L  


 


Potassium Level 3.9 MEQ/L  


 


Chloride Level 108 MEQ/L  


 


Carbon Dioxide Level 28.1 MEQ/L  


 


Anion Gap 7 MEQ/L  


 


Blood Urea Nitrogen 16 MG/DL  


 


Creatinine 0.82 MG/DL  


 


Estimat Glomerular Filtration 69 ML/MIN  





Rate   


 


Random Glucose 103 MG/DL  


 


Hemoglobin A1c 5.6 %  


 


Calcium Level 8.9 MG/DL  


 


Total Bilirubin 0.4 MG/DL  0.7 MG/DL


 


Aspartate Amino Transf 48 U/L  32 U/L





(AST/SGOT)   


 


Alanine Aminotransferase 87 U/L  72 U/L





(ALT/SGPT)   


 


Alkaline Phosphatase 106 U/L  87 U/L


 


Total Protein 7.7 GM/DL  6.5 GM/DL


 


Albumin 3.9 GM/DL  3.3 GM/DL


 


Free Thyroxine 0.90 NG/DL  


 


Thyroid Stimulating Hormone 1.190 uIU/ML  





3rd Gen   


 


Urine Color  LIGHT-YELLOW  


 


Urine Turbidity  CLEAR  


 


Urine pH  6.0  


 


Urine Specific Gravity  1.003  


 


Urine Protein  NEG mg/dL 


 


Urine Glucose (UA)  NEG mg/dL 


 


Urine Ketones  NEG mg/dL 


 


Urine Occult Blood  NEG  


 


Urine Nitrite  NEG  


 


Urine Bilirubin  NEG  


 


Urine Urobilinogen  LESS THAN 2.0 





  MG/DL 


 


Urine Leukocyte Esterase  NEG  


 


Urine WBC  LESS THAN 1 





  /hpf 


 


Urine Squamous Epithelial  1 /hpf 





Cells   


 


Urine Bacteria  RARE /hpf 


 


Microscopic Urinalysis Comment  CULT NOT 





  INDICATED 


 


Direct Bilirubin   0.2 MG/DL


 


Indirect Bilirubin   0.5 MG/DL


 


Triglycerides Level   136 MG/DL


 


Cholesterol Level   126 MG/DL


 


LDL Cholesterol   53 MG/DL


 


HDL Cholesterol   45.6 MG/DL


 


Cholesterol/HDL Ratio   2.76 RATIO








Imaging





Last Impressions








Head Magnetic Resonance Angiography 8/16/17 0922 Signed





Impressions: 





 Service Date/Time:  Wednesday, August 16, 2017 13:10 - CONCLUSION:  1. 





 Diminutive basilar artery. The left vertebral is not identified. The right 





 vertebral is quite small in size with segmental occlusion prior to its 

junction 





 with the basilar. 2. The posterior circulation feeds via the P-comm. The 





 remainder of the exam is within normal limits.     Leland Moya MD 


 


Brain MRI 8/16/17 0922 Signed





Impressions: 





 Service Date/Time:  Wednesday, August 16, 2017 13:10 - CONCLUSION:  1. 

Negative 





 examination. Stable compared to prior dated 2/09/17.     Leland Moya MD 


 


Head CT 8/16/17 0742 Signed





Impressions: 





 Service Date/Time:  Wednesday, August 16, 2017 08:19 - CONCLUSION:  Normal 





 examination.       Bolivar Fierro Jr., MD 


 


Chest X-Ray 8/16/17 0742 Signed





Impressions: 





 Service Date/Time:  Wednesday, August 16, 2017 07:52 - CONCLUSION: Normal 





 examination.       Bolivar Fierro Jr., MD 


 


Carotid Artery Ultrasound 8/16/17 0000 Signed





Impressions: 





 Service Date/Time:  Wednesday, August 16, 2017 10:11 - CONCLUSION:  1. 

Calcific 





 plaque in the carotid bulb with no significant stenosis. 2. Multiple thyroid 





 lesions.     Austyn Lacey MD 








Objective Remarks


General: NAD, AAOx3


Chest: CTA


Cardiac:





A/P


Problem List:  


(1) Paresthesia of left arm and leg


Status:  Acute


Plan:  


- Pt is a 68 y/o female with hx of previous right thalamic stroke in 2/2017. 

She is on ASA 325mg po daily. 


- Pt presented to the ED with numbness sensation of the left side of her face, 

left UE and left LE in certain areas


- Head CT (8/16) --> Negative: 


- MRI Brain (8/16) -->  Negative examination. Stable compared to prior dated 2/ 09/17. 


- MRA head (8/16) --> Diminutive basilar artery. The left vertebral is not 

identified. The right vertebral is quite small in


 size with segmental occlusion prior to its junction with the basilar. The 

posterior circulation feeds via the P-comm. The 


 remainder of the exam is within normal limits.   


- Carotid US (8/16) --> Calcific plaque in the carotid bulb with no significant 

stenosis. Multiple thyroid lesions.


- Check TSH/Free T4, findings in the thyroid will need to be followed up on my 

her PCP. 


- Neurology was consulted from the ED. 


- 2D echo ordered


- Telemetry


- ASA


- Statin


- Pt may benefit from addition of Plavix but will discuss with Neurology


- PT


- Neuro checks


- Supportive care





- DVT prophylaxis with SCDs





(2) History of CVA (cerebrovascular accident)


Status:  Chronic


Plan:  


- See above. 





(3) Hyperlipidemia


Status:  Chronic


Plan:  


- Cont. home meds





Assessment and Plan


Patient examined.


Assessment and plan formulated with Tiarra Guzman PA-C.


I agree with the above.











Tiarra Guzman Aug 17, 2017 14:53


Fortino Moralez DO Aug 21, 2017 00:54

## 2017-08-18 NOTE — HM
Date Performed: 08/16/2017       Time Performed: 19:53:00

 

HOOKUP DATE:      08/16/17 07:53:00 PM Wed 

 

     

ANALYSIS START TIME:      8/16/2017 7:58:00 PM

     

ANALYSIS END TIME:      8/17/2017 8:02:00 PM

     

PATIENT AGE:      67

     

PATIENT HEIGHT:      61

     

PATIENT WEIGHT:      132

     

DRUG LIST:      room #[id number] / D/Cd home

     

PATIENT DIAGNOSIS:      NEURO SYMPTOMS

     

TEST NARRATIVE:          The patient's average heart rate was 69 BPM.  Heart rates greater than 120 B
PM were noted < 1% of the time.  Heart rates less than 50 BPM were noted < 1% of the time.     No noris
ses exceeding 2.0 seconds were noted.     3619 ventricular ectopics, which represented 4% of the tota
l beat count, were noted.  The highest ventricular ectopic frequency occurred from 07:00 PM to 08:00 
PM Thu.  During this time 471 VE(s) occurred.  Ventricular ectopics were observed as 3618 isolated be
at(s) only.  No couplets or runs were noted.  Some of the ventricular beats occurred in bigeminal cyc
les.     16 supraventricular ectopics, which represented < 1% of the total beat count, were noted.  T
he highest supraventricular ectopic frequency occurred from 11:00 AM to 12:00 PM Thu.  During this ti
me 3 SVE(s) occurred.     No episodes of ST depression (defined as -1.0 mm or more) were noted in issa
nnel 1.  No episodes of ST depression (defined as -1.0 mm or more) were noted in channel 2.  No episo
robert of ST depression (defined as -1.0 mm or more) were noted in channel 3.

     

TEST INTERPRETATION:      Holter monitor demonstrates Sinus rhythm 

 

 with a period of sinus bradycardia for 48 beats per minute and sinus tachycardia at 124 bpm. A rare 
PVC and PAC were noted. No repetitive arrhythmias were seen. No atrial fibrillation was appreciated. 
                                                                    Signed by : Santhosh Yoon

## 2018-05-03 ENCOUNTER — HOSPITAL ENCOUNTER (EMERGENCY)
Dept: HOSPITAL 17 - NEPE | Age: 68
Discharge: HOME | End: 2018-05-03
Payer: MEDICARE

## 2018-05-03 VITALS
DIASTOLIC BLOOD PRESSURE: 77 MMHG | HEART RATE: 82 BPM | SYSTOLIC BLOOD PRESSURE: 172 MMHG | TEMPERATURE: 98.1 F | OXYGEN SATURATION: 99 % | RESPIRATION RATE: 15 BRPM

## 2018-05-03 VITALS — DIASTOLIC BLOOD PRESSURE: 80 MMHG | SYSTOLIC BLOOD PRESSURE: 165 MMHG

## 2018-05-03 VITALS
RESPIRATION RATE: 16 BRPM | DIASTOLIC BLOOD PRESSURE: 81 MMHG | HEART RATE: 79 BPM | OXYGEN SATURATION: 98 % | SYSTOLIC BLOOD PRESSURE: 163 MMHG

## 2018-05-03 DIAGNOSIS — K21.9: ICD-10-CM

## 2018-05-03 DIAGNOSIS — H92.22: Primary | ICD-10-CM

## 2018-05-03 DIAGNOSIS — Z79.899: ICD-10-CM

## 2018-05-03 DIAGNOSIS — E78.00: ICD-10-CM

## 2018-05-03 LAB
ALBUMIN SERPL-MCNC: 3.9 GM/DL (ref 3.4–5)
ALP SERPL-CCNC: 76 U/L (ref 45–117)
ALT SERPL-CCNC: 37 U/L (ref 10–53)
AST SERPL-CCNC: 24 U/L (ref 15–37)
BASOPHILS # BLD AUTO: 0.1 TH/MM3 (ref 0–0.2)
BASOPHILS NFR BLD: 1 % (ref 0–2)
BILIRUB SERPL-MCNC: 0.5 MG/DL (ref 0.2–1)
BUN SERPL-MCNC: 14 MG/DL (ref 7–18)
CALCIUM SERPL-MCNC: 8.9 MG/DL (ref 8.5–10.1)
CHLORIDE SERPL-SCNC: 107 MEQ/L (ref 98–107)
CREAT SERPL-MCNC: 0.8 MG/DL (ref 0.5–1)
EOSINOPHIL # BLD: 0.3 TH/MM3 (ref 0–0.4)
EOSINOPHIL NFR BLD: 2.9 % (ref 0–4)
ERYTHROCYTE [DISTWIDTH] IN BLOOD BY AUTOMATED COUNT: 12.4 % (ref 11.6–17.2)
GFR SERPLBLD BASED ON 1.73 SQ M-ARVRAT: 71 ML/MIN (ref 89–?)
GLUCOSE SERPL-MCNC: 99 MG/DL (ref 74–106)
HCO3 BLD-SCNC: 28.1 MEQ/L (ref 21–32)
HCT VFR BLD CALC: 40.8 % (ref 35–46)
HGB BLD-MCNC: 13.7 GM/DL (ref 11.6–15.3)
INR PPP: 1 RATIO
LYMPHOCYTES # BLD AUTO: 2.1 TH/MM3 (ref 1–4.8)
LYMPHOCYTES NFR BLD AUTO: 23.6 % (ref 9–44)
MCH RBC QN AUTO: 30.2 PG (ref 27–34)
MCHC RBC AUTO-ENTMCNC: 33.6 % (ref 32–36)
MCV RBC AUTO: 90 FL (ref 80–100)
MONOCYTE #: 0.7 TH/MM3 (ref 0–0.9)
MONOCYTES NFR BLD: 8 % (ref 0–8)
NEUTROPHILS # BLD AUTO: 5.7 TH/MM3 (ref 1.8–7.7)
NEUTROPHILS NFR BLD AUTO: 64.5 % (ref 16–70)
PLATELET # BLD: 234 TH/MM3 (ref 150–450)
PMV BLD AUTO: 9.9 FL (ref 7–11)
PROT SERPL-MCNC: 7.1 GM/DL (ref 6.4–8.2)
PROTHROMBIN TIME: 10.3 SEC (ref 9.8–11.6)
RBC # BLD AUTO: 4.53 MIL/MM3 (ref 4–5.3)
SODIUM SERPL-SCNC: 144 MEQ/L (ref 136–145)
WBC # BLD AUTO: 8.8 TH/MM3 (ref 4–11)

## 2018-05-03 PROCEDURE — 99284 EMERGENCY DEPT VISIT MOD MDM: CPT

## 2018-05-03 PROCEDURE — 85025 COMPLETE CBC W/AUTO DIFF WBC: CPT

## 2018-05-03 PROCEDURE — 85610 PROTHROMBIN TIME: CPT

## 2018-05-03 PROCEDURE — 70480 CT ORBIT/EAR/FOSSA W/O DYE: CPT

## 2018-05-03 PROCEDURE — 80053 COMPREHEN METABOLIC PANEL: CPT
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
## 2018-05-03 NOTE — RADRPT
EXAM DATE/TIME:  05/03/2018 21:13 

 

[facility] COMPARISON:     

No previous studies available for comparison.

 

 

INDICATIONS :     

Left inner ear bleeding.

                      

 

RADIATION DOSE:     

52.80 CTDIvol (mGy) 

 

 

MEDICAL HISTORY :     

None  

 

SURGICAL HISTORY :      

None. 

 

ENCOUNTER:      

Initial

 

ACUITY:      

1 day

 

PAIN SCORE:      

8/10

 

LOCATION:       

Left  ear

 

TECHNIQUE:     

Volumetric scanning of the temporal bone was performed.  Using automated exposure control and adjustm
ent of the mA and/or kV according to patient size, radiation dose was kept as low as reasonably achie
vable to obtain optimal diagnostic quality images. DICOM format image data is available electronicall
y for review and comparison.  

 

FINDINGS:     

There is some trace fluid or cerumen in the left external auditory canal and near the tympanic membra
ne. No bony abnormalities. There is no opacification of the middle ear and ossicles appear intact. Ma
stoids are clear. There is mucosal thickening in the paranasal sinuses at the sphenoid and ethmoid ai
r cells.

 

CONCLUSION:     

1. Trace fluid or cerumen in the left external auditory canal and adjacent to the tympanic membrane. 
No acute bony abnormalities. Middle ear and mastoids are clear. Mucosal thickening in the ethmoids an
d sphenoid.

 

 

 

 Steven Riggs MD on May 03, 2018 at 21:43           

Board Certified Radiologist.

 This report was verified electronically.